# Patient Record
Sex: FEMALE | Race: WHITE | Employment: STUDENT | URBAN - METROPOLITAN AREA
[De-identification: names, ages, dates, MRNs, and addresses within clinical notes are randomized per-mention and may not be internally consistent; named-entity substitution may affect disease eponyms.]

---
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
## 2024-04-14 ENCOUNTER — HOSPITAL ENCOUNTER (INPATIENT)
Age: 19
LOS: 3 days | Discharge: HOME | DRG: 885 | End: 2024-04-17
Attending: EMERGENCY MEDICINE | Admitting: PSYCHIATRY & NEUROLOGY
Payer: COMMERCIAL

## 2024-04-14 ENCOUNTER — HOSPITAL ENCOUNTER (EMERGENCY)
Age: 19
Discharge: ANOTHER HOSPITAL | End: 2024-04-14
Attending: EMERGENCY MEDICINE
Payer: COMMERCIAL

## 2024-04-14 VITALS
TEMPERATURE: 97.8 F | OXYGEN SATURATION: 99 % | SYSTOLIC BLOOD PRESSURE: 118 MMHG | DIASTOLIC BLOOD PRESSURE: 64 MMHG | HEART RATE: 68 BPM | RESPIRATION RATE: 16 BRPM | BODY MASS INDEX: 22.43 KG/M2 | WEIGHT: 148 LBS | HEIGHT: 68 IN

## 2024-04-14 DIAGNOSIS — T50.904A DRUG OVERDOSE OF UNDETERMINED INTENT, INITIAL ENCOUNTER: Primary | ICD-10-CM

## 2024-04-14 DIAGNOSIS — T50.902A MEDICATION OVERDOSE, INTENTIONAL SELF-HARM, INITIAL ENCOUNTER (HCC): ICD-10-CM

## 2024-04-14 DIAGNOSIS — F10.920 ACUTE ALCOHOLIC INTOXICATION WITHOUT COMPLICATION (HCC): Primary | ICD-10-CM

## 2024-04-14 DIAGNOSIS — F32.89 OTHER DEPRESSION: ICD-10-CM

## 2024-04-14 DIAGNOSIS — F32.9 MAJOR DEPRESSIVE DISORDER WITH CURRENT ACTIVE EPISODE, UNSPECIFIED DEPRESSION EPISODE SEVERITY, UNSPECIFIED WHETHER RECURRENT: ICD-10-CM

## 2024-04-14 PROBLEM — F33.9 MAJOR DEPRESSIVE DISORDER, RECURRENT (HCC): Status: ACTIVE | Noted: 2024-04-14

## 2024-04-14 LAB
ALBUMIN SERPL-MCNC: 4.6 G/DL (ref 3.5–4.6)
ALP SERPL-CCNC: 74 U/L (ref 40–130)
ALT SERPL-CCNC: 15 U/L (ref 0–33)
AMPHET UR QL SCN: NORMAL
AMPHET UR QL SCN: NORMAL
ANION GAP SERPL CALCULATED.3IONS-SCNC: 12 MEQ/L (ref 9–15)
APAP SERPL-MCNC: <5 UG/ML (ref 10–30)
AST SERPL-CCNC: 27 U/L (ref 0–35)
BACTERIA URNS QL MICRO: ABNORMAL /HPF
BARBITURATES UR QL SCN: NORMAL
BARBITURATES UR QL SCN: NORMAL
BASOPHILS # BLD: 0 K/UL (ref 0–0.2)
BASOPHILS NFR BLD: 0.4 %
BENZODIAZ UR QL SCN: NORMAL
BENZODIAZ UR QL SCN: NORMAL
BILIRUB SERPL-MCNC: 0.4 MG/DL (ref 0.2–0.7)
BILIRUB UR QL STRIP: NEGATIVE
BUN SERPL-MCNC: 6 MG/DL (ref 6–20)
CALCIUM SERPL-MCNC: 9.3 MG/DL (ref 8.5–9.9)
CANNABINOIDS UR QL SCN: NORMAL
CANNABINOIDS UR QL SCN: NORMAL
CHLORIDE SERPL-SCNC: 103 MEQ/L (ref 95–107)
CLARITY UR: ABNORMAL
CO2 SERPL-SCNC: 26 MEQ/L (ref 20–31)
COCAINE UR QL SCN: NORMAL
COCAINE UR QL SCN: NORMAL
COLOR UR: YELLOW
CREAT SERPL-MCNC: 0.76 MG/DL (ref 0.5–0.9)
DRUG SCREEN COMMENT UR-IMP: NORMAL
DRUG SCREEN COMMENT UR-IMP: NORMAL
EKG ATRIAL RATE: 74 BPM
EKG P AXIS: 49 DEGREES
EKG P-R INTERVAL: 166 MS
EKG Q-T INTERVAL: 422 MS
EKG QRS DURATION: 84 MS
EKG QTC CALCULATION (BAZETT): 468 MS
EKG R AXIS: 82 DEGREES
EKG T AXIS: 56 DEGREES
EKG VENTRICULAR RATE: 74 BPM
EOSINOPHIL # BLD: 0.2 K/UL (ref 0–0.7)
EOSINOPHIL NFR BLD: 1.9 %
EPI CELLS #/AREA URNS AUTO: ABNORMAL /HPF (ref 0–5)
ERYTHROCYTE [DISTWIDTH] IN BLOOD BY AUTOMATED COUNT: 12 % (ref 11.5–14.5)
ETHANOL PERCENT: 0.12 G/DL
ETHANOLAMINE SERPL-MCNC: 143 MG/DL (ref 0–0.08)
FENTANYL SCREEN, URINE: NORMAL
FENTANYL SCREEN, URINE: NORMAL
GLOBULIN SER CALC-MCNC: 3.3 G/DL (ref 2.3–3.5)
GLUCOSE SERPL-MCNC: 86 MG/DL (ref 70–99)
GLUCOSE UR STRIP-MCNC: NEGATIVE MG/DL
HCG UR QL: NEGATIVE
HCT VFR BLD AUTO: 38.2 % (ref 37–47)
HGB BLD-MCNC: 12.6 G/DL (ref 12–16)
HGB UR QL STRIP: ABNORMAL
HYALINE CASTS #/AREA URNS AUTO: ABNORMAL /HPF (ref 0–5)
KETONES UR STRIP-MCNC: NEGATIVE MG/DL
LEUKOCYTE ESTERASE UR QL STRIP: ABNORMAL
LYMPHOCYTES # BLD: 2.3 K/UL (ref 1–4.8)
LYMPHOCYTES NFR BLD: 29 %
MAGNESIUM SERPL-MCNC: 2.1 MG/DL (ref 1.7–2.4)
MCH RBC QN AUTO: 29.1 PG (ref 27–31.3)
MCHC RBC AUTO-ENTMCNC: 33 % (ref 33–37)
MCV RBC AUTO: 88.2 FL (ref 79.4–94.8)
METHADONE UR QL SCN: NORMAL
METHADONE UR QL SCN: NORMAL
MONOCYTES # BLD: 0.5 K/UL (ref 0.2–0.8)
MONOCYTES NFR BLD: 5.9 %
NEUTROPHILS # BLD: 4.9 K/UL (ref 1.4–6.5)
NEUTS SEG NFR BLD: 62.5 %
NITRITE UR QL STRIP: NEGATIVE
OPIATES UR QL SCN: NORMAL
OPIATES UR QL SCN: NORMAL
OXYCODONE UR QL SCN: NORMAL
OXYCODONE UR QL SCN: NORMAL
PCP UR QL SCN: NORMAL
PCP UR QL SCN: NORMAL
PH UR STRIP: 6 [PH] (ref 5–9)
PLATELET # BLD AUTO: 298 K/UL (ref 130–400)
POTASSIUM SERPL-SCNC: 3.7 MEQ/L (ref 3.4–4.9)
PROPOXYPH UR QL SCN: NORMAL
PROPOXYPH UR QL SCN: NORMAL
PROT SERPL-MCNC: 7.9 G/DL (ref 6.3–8)
PROT UR STRIP-MCNC: NEGATIVE MG/DL
RBC # BLD AUTO: 4.33 M/UL (ref 4.2–5.4)
RBC #/AREA URNS AUTO: ABNORMAL /HPF (ref 0–5)
SALICYLATES SERPL-MCNC: <0.3 MG/DL (ref 15–30)
SODIUM SERPL-SCNC: 141 MEQ/L (ref 135–144)
SP GR UR STRIP: 1.01 (ref 1–1.03)
TSH SERPL-MCNC: 1.25 UIU/ML (ref 0.44–3.86)
URINE REFLEX TO CULTURE: YES
UROBILINOGEN UR STRIP-ACNC: 0.2 E.U./DL
WBC # BLD AUTO: 7.8 K/UL (ref 4.5–11)
WBC #/AREA URNS AUTO: ABNORMAL /HPF (ref 0–5)

## 2024-04-14 PROCEDURE — 81001 URINALYSIS AUTO W/SCOPE: CPT

## 2024-04-14 PROCEDURE — 99285 EMERGENCY DEPT VISIT HI MDM: CPT

## 2024-04-14 PROCEDURE — 80143 DRUG ASSAY ACETAMINOPHEN: CPT

## 2024-04-14 PROCEDURE — 6370000000 HC RX 637 (ALT 250 FOR IP): Performed by: EMERGENCY MEDICINE

## 2024-04-14 PROCEDURE — 80053 COMPREHEN METABOLIC PANEL: CPT

## 2024-04-14 PROCEDURE — 93005 ELECTROCARDIOGRAM TRACING: CPT

## 2024-04-14 PROCEDURE — 93010 ELECTROCARDIOGRAM REPORT: CPT | Performed by: INTERNAL MEDICINE

## 2024-04-14 PROCEDURE — 83036 HEMOGLOBIN GLYCOSYLATED A1C: CPT

## 2024-04-14 PROCEDURE — 87491 CHLMYD TRACH DNA AMP PROBE: CPT

## 2024-04-14 PROCEDURE — 83735 ASSAY OF MAGNESIUM: CPT

## 2024-04-14 PROCEDURE — 84703 CHORIONIC GONADOTROPIN ASSAY: CPT

## 2024-04-14 PROCEDURE — 80179 DRUG ASSAY SALICYLATE: CPT

## 2024-04-14 PROCEDURE — 84443 ASSAY THYROID STIM HORMONE: CPT

## 2024-04-14 PROCEDURE — 87591 N.GONORRHOEAE DNA AMP PROB: CPT

## 2024-04-14 PROCEDURE — 82077 ASSAY SPEC XCP UR&BREATH IA: CPT

## 2024-04-14 PROCEDURE — 1240000000 HC EMOTIONAL WELLNESS R&B

## 2024-04-14 PROCEDURE — 80307 DRUG TEST PRSMV CHEM ANLYZR: CPT

## 2024-04-14 PROCEDURE — 36415 COLL VENOUS BLD VENIPUNCTURE: CPT

## 2024-04-14 PROCEDURE — 2580000003 HC RX 258: Performed by: EMERGENCY MEDICINE

## 2024-04-14 PROCEDURE — 6370000000 HC RX 637 (ALT 250 FOR IP): Performed by: STUDENT IN AN ORGANIZED HEALTH CARE EDUCATION/TRAINING PROGRAM

## 2024-04-14 PROCEDURE — 85025 COMPLETE CBC W/AUTO DIFF WBC: CPT

## 2024-04-14 PROCEDURE — 96360 HYDRATION IV INFUSION INIT: CPT

## 2024-04-14 PROCEDURE — 87086 URINE CULTURE/COLONY COUNT: CPT

## 2024-04-14 RX ORDER — ACETAMINOPHEN 325 MG/1
650 TABLET ORAL EVERY 4 HOURS PRN
Status: DISCONTINUED | OUTPATIENT
Start: 2024-04-14 | End: 2024-04-17 | Stop reason: HOSPADM

## 2024-04-14 RX ORDER — ACETAMINOPHEN 500 MG
1000 TABLET ORAL ONCE
Status: COMPLETED | OUTPATIENT
Start: 2024-04-14 | End: 2024-04-14

## 2024-04-14 RX ORDER — BENZTROPINE MESYLATE 1 MG/ML
2 INJECTION INTRAMUSCULAR; INTRAVENOUS 2 TIMES DAILY PRN
Status: DISCONTINUED | OUTPATIENT
Start: 2024-04-14 | End: 2024-04-17 | Stop reason: HOSPADM

## 2024-04-14 RX ORDER — HALOPERIDOL 5 MG/1
5 TABLET ORAL EVERY 6 HOURS PRN
Status: DISCONTINUED | OUTPATIENT
Start: 2024-04-14 | End: 2024-04-17 | Stop reason: HOSPADM

## 2024-04-14 RX ORDER — HALOPERIDOL 5 MG/ML
5 INJECTION INTRAMUSCULAR EVERY 6 HOURS PRN
Status: DISCONTINUED | OUTPATIENT
Start: 2024-04-14 | End: 2024-04-17 | Stop reason: HOSPADM

## 2024-04-14 RX ORDER — NITROFURANTOIN 25; 75 MG/1; MG/1
100 CAPSULE ORAL EVERY 12 HOURS SCHEDULED
Status: DISCONTINUED | OUTPATIENT
Start: 2024-04-14 | End: 2024-04-17 | Stop reason: HOSPADM

## 2024-04-14 RX ORDER — HYDROXYZINE HYDROCHLORIDE 50 MG/ML
50 INJECTION, SOLUTION INTRAMUSCULAR EVERY 6 HOURS PRN
Status: DISCONTINUED | OUTPATIENT
Start: 2024-04-14 | End: 2024-04-17 | Stop reason: HOSPADM

## 2024-04-14 RX ORDER — LORAZEPAM 1 MG/1
1 TABLET ORAL ONCE
Status: COMPLETED | OUTPATIENT
Start: 2024-04-14 | End: 2024-04-14

## 2024-04-14 RX ORDER — PROCHLORPERAZINE MALEATE 10 MG
10 TABLET ORAL ONCE
Status: COMPLETED | OUTPATIENT
Start: 2024-04-14 | End: 2024-04-14

## 2024-04-14 RX ORDER — 0.9 % SODIUM CHLORIDE 0.9 %
1000 INTRAVENOUS SOLUTION INTRAVENOUS ONCE
Status: COMPLETED | OUTPATIENT
Start: 2024-04-14 | End: 2024-04-14

## 2024-04-14 RX ORDER — MAGNESIUM HYDROXIDE/ALUMINUM HYDROXICE/SIMETHICONE 120; 1200; 1200 MG/30ML; MG/30ML; MG/30ML
30 SUSPENSION ORAL PRN
Status: DISCONTINUED | OUTPATIENT
Start: 2024-04-14 | End: 2024-04-17 | Stop reason: HOSPADM

## 2024-04-14 RX ORDER — TRAZODONE HYDROCHLORIDE 50 MG/1
50 TABLET ORAL NIGHTLY PRN
Status: DISCONTINUED | OUTPATIENT
Start: 2024-04-14 | End: 2024-04-17 | Stop reason: HOSPADM

## 2024-04-14 RX ORDER — DIPHENHYDRAMINE HCL 25 MG
50 TABLET ORAL ONCE
Status: COMPLETED | OUTPATIENT
Start: 2024-04-14 | End: 2024-04-14

## 2024-04-14 RX ORDER — HYDROXYZINE PAMOATE 50 MG/1
50 CAPSULE ORAL EVERY 6 HOURS PRN
Status: DISCONTINUED | OUTPATIENT
Start: 2024-04-14 | End: 2024-04-17 | Stop reason: HOSPADM

## 2024-04-14 RX ADMIN — DIPHENHYDRAMINE HCL 50 MG: 25 TABLET ORAL at 10:35

## 2024-04-14 RX ADMIN — NITROFURANTOIN MONOHYDRATE/MACROCRYSTALS 100 MG: 75; 25 CAPSULE ORAL at 09:29

## 2024-04-14 RX ADMIN — LORAZEPAM 1 MG: 1 TABLET ORAL at 05:20

## 2024-04-14 RX ADMIN — NITROFURANTOIN MONOHYDRATE/MACROCRYSTALS 100 MG: 75; 25 CAPSULE ORAL at 21:21

## 2024-04-14 RX ADMIN — SODIUM CHLORIDE 1000 ML: 9 INJECTION, SOLUTION INTRAVENOUS at 01:13

## 2024-04-14 RX ADMIN — ACTIVATED CHARCOAL 50 G: 208 SUSPENSION ORAL at 01:13

## 2024-04-14 RX ADMIN — PROCHLORPERAZINE MALEATE 10 MG: 10 TABLET ORAL at 10:35

## 2024-04-14 RX ADMIN — ACETAMINOPHEN 1000 MG: 500 TABLET ORAL at 10:35

## 2024-04-14 ASSESSMENT — PAIN - FUNCTIONAL ASSESSMENT
PAIN_FUNCTIONAL_ASSESSMENT: NONE - DENIES PAIN
PAIN_FUNCTIONAL_ASSESSMENT: NONE - DENIES PAIN

## 2024-04-14 ASSESSMENT — SLEEP AND FATIGUE QUESTIONNAIRES
DO YOU USE A SLEEP AID: YES
AVERAGE NUMBER OF SLEEP HOURS: 6
SLEEP PATTERN: NORMAL
DO YOU HAVE DIFFICULTY SLEEPING: NO

## 2024-04-14 ASSESSMENT — LIFESTYLE VARIABLES
HOW OFTEN DO YOU HAVE A DRINK CONTAINING ALCOHOL: 2-4 TIMES A MONTH
HOW MANY STANDARD DRINKS CONTAINING ALCOHOL DO YOU HAVE ON A TYPICAL DAY: 1 OR 2
HOW MANY STANDARD DRINKS CONTAINING ALCOHOL DO YOU HAVE ON A TYPICAL DAY: 1 OR 2
HOW OFTEN DO YOU HAVE A DRINK CONTAINING ALCOHOL: 2-4 TIMES A MONTH

## 2024-04-14 ASSESSMENT — PAIN SCALES - GENERAL: PAINLEVEL_OUTOF10: 6
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
## 2024-04-14 NOTE — ED TRIAGE NOTES
Pt. Presents to ED with complaints of taking a handful of her lexapro and lamictal tonight. Pt. States she was having thought of past experiences/trauma and she took a handful of the medication to get those thoughts out of her mind.  She had no intention of harming herself she states.

## 2024-04-14 NOTE — ED NOTES
Called [facility] the room is ready for the pt, advised she is visiting with her parents here from [place]
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
## 2024-04-14 NOTE — ED NOTES
Call received from Emery (director of counseling at [facility]) He left number and also encouraged for her to possibly be released to her parents instead of being admitted. States her parents plan on driving up today from [place] and he feels  they should take over the care of her. Informed Emery I can not confirm if this patient is here or not but if she was I would pass along the information to her with his phone number. He also states to let her know if she is being admitted to contact him and he would notify her professors for excused absences.

## 2024-04-14 NOTE — ED NOTES
Patient voices c/o migraine. States she takes migraine medication as needed but unsure what she takes. Dr. Shaver aware of request for medication for migraine

## 2024-04-14 NOTE — ED NOTES
Pt was asked, advised of mothers request to remain at the hospital here until a bed was available for her or going/transfer to possibly [facility] or another facility.  Pt stated she would go to [facility] rather than waiting for a bed here at the hospital

## 2024-04-14 NOTE — ED NOTES
Discussed with the pt that she has a visitor here to see her, her  from [facility], per pt she will visit with the .  Pt is visiting with her  from [facility] at her bedside.

## 2024-04-14 NOTE — ED NOTES
Pt's mother Sariah called to the -ER, advised could not give her information without her having her daughter's HIPAA code, put the caller on hold and asked the pt if she wanted her mother to have her code, she said her mother could have her code. Pt's mother Sariah wanted her to possibly stay at the ER until a bed was available for her on [facility] the unit here instead of [facility] [place].  Advised that there would not possibly be a bed on the unit tomorrow would depend on D/C and geoff if a bed was available for her tomorrow it would not be available until late afternoon and would depend on D/C from the unit.  Advised would leave the decision to stay or transfer to the pt.  Pt's mother has the pt's HIPAA code from the pt.

## 2024-04-14 NOTE — GROUP NOTE
Group Therapy Note    Date: 4/14/2024    Group Start Time: 1730  Group End Time: 1820  Group Topic: Healthy Living/Wellness    [facility] Carrie Ortiz RN; , GOVIND Butler        Group Therapy Note    Attendees: 11/25       Patient's Goal:  recreation    Notes:      Status After Intervention:  Unchanged    Participation Level: Active Listener    Participation Quality: Appropriate      Speech:  normal      Thought Process/Content: Logical      Affective Functioning: Congruent      Mood: euthymic      Level of consciousness:  Alert and Oriented x4      Response to Learning: Progressing to goal      Endings: None Reported    Modes of Intervention: Socialization      Discipline Responsible: Registered Nurse      Signature:  Carrie Vega RN

## 2024-04-14 NOTE — ED NOTES
Poison control contacted.  Per poison control they recommend charcoal if medication ingested within an hour.  Also recommend suicide work up, EKG, and supportive care such as vital signs, mental status.  Dr. Dominique notified.

## 2024-04-14 NOTE — ED NOTES
Called lab regarding the urine test called earlier in regards to and per lab it is a send out and the test has been processed for the send out.

## 2024-04-14 NOTE — ED NOTES
Patient placed with [facility] at this time. Recommended for them to start with [place] as first option.

## 2024-04-14 NOTE — ED NOTES
Reviewed with Dr Mario the pt that was being sent out due to no beds on [facility] but their is a female D/C on [facility] today, per Dr Mario the pt can be admitted to the unit, pt was advised she would be admitted today to [facility] but the bed is not available currently until later.

## 2024-04-14 NOTE — ED NOTES
Dr. Mario was updated and reviewed situation, family concerns and calls from the Psychiatrist in [place]. Dr. Mario is agreeable that patient needs inpatient psychiatric care regardless if family believes this was attention seeking behavior or alcohol induced.

## 2024-04-14 NOTE — ED TRIAGE NOTES
Pt arrived to ED via EMS with c/o of ingestion. Pt is a transfer from [facility]. Per EMS at approx 2330 pt took a handful of lexpro. Pt states she has been drinking tonight. Pt denies any thoughts of SI/HI. Pt is a&ox4. Pt is ambulatory from EMS cot to ER stretcher with steady gait

## 2024-04-14 NOTE — ED NOTES
Dr Anabela Shetty called and wanted to talk with a doctor taking care of the pt, advised would try and get the medical doctor to talk with her, no information was given to the psychiatrist who called, transferred the call to Dr Shaver, alerted the doctor to the call before transferring the caller.  Never advised the psychiatrist of any information on the pt.  Transferred to the doctor in main ER.

## 2024-04-14 NOTE — ED NOTES
Called lab and they have enough urine in the lab that the lab will run the test for trachomatis/Gonorrheae
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
## 2024-04-14 NOTE — ED NOTES
Dr Anabela Shetty, Psychiatrist called wanting to talk with the pt, no information given to the doctor, she is on hold.  Asked pt if she wanted to talk with her Psychiatrist, explained HIPAA to the pt  and gave her  code  explained once she give he cod   someone the the can receive care on her while she is at the ER

## 2024-04-14 NOTE — ED PROVIDER NOTES
[facility] ED  eMERGENCY dEPARTMENT eNCOUnter      Pt Name: Juvencio Yun  MRN: [id number]  Birthdate 2005  Date of evaluation: 4/14/2024  Provider: Selvin Dominique MD    CHIEF COMPLAINT       Chief Complaint   Patient presents with    Mental Health Problem     Pt. Took handful of lexapro and lamictal approximately 15 minute prior to arrival         HISTORY OF PRESENT ILLNESS   (Location/Symptom, Timing/Onset,Context/Setting, Quality, Duration, Modifying Factors, Severity)  Note limiting factors.   Juvencio Yun is a 19 y.o. female who presents to the emergency department with complaint of tachycardia and may have follow-up Lexapro and Lamictal approximately 50 minutes prior to presentation.  Patient claims that she was depressed, and was having flashbacks from prior traumatic events in her life and took the pills to suppress those.  Denies suicidal or homicidal ideation or thoughts.  Denies prior attempts of suicide with drug overdose.  She is on Lexapro and Lamictal for depression.  She also admits to be drinking alcohol prior to this episode.  Comorbid conditions includes moderate persistent asthma and chronic nonseasonal allergic rhinitis.  She appears tearful and emotional.    HPI    Nursing Notes were reviewed.    REVIEW OF SYSTEMS    (2-9 systems for level 4, 10 or more for level 5)     Review of Systems   Constitutional: Negative.  Negative for activity change, appetite change, chills, fatigue and fever.   HENT:  Negative for congestion, ear discharge, ear pain, hearing loss, rhinorrhea, sinus pressure and sore throat.    Eyes:  Negative for photophobia, pain and visual disturbance.   Respiratory:  Negative for apnea, cough, shortness of breath and wheezing.    Cardiovascular:  Negative for chest pain, palpitations and leg swelling.   Gastrointestinal:  Negative for abdominal distention, abdominal pain, constipation, diarrhea, nausea and vomiting.   Endocrine: Negative for cold intolerance,

## 2024-04-14 NOTE — ED NOTES
Explained and answered her questions regarding the med's given to her, she wanted to know how long the Antibiotic would be needed, advised she would need to take it possibly twice a day for a week

## 2024-04-14 NOTE — ED NOTES
[facility] will have discharge today. Will consult with Dr. Mario to see if patient can go to [facility] instead of being transferred out.

## 2024-04-15 LAB
BACTERIA UR CULT: NORMAL
ESTIMATED AVERAGE GLUCOSE: 91 MG/DL
HBA1C MFR BLD: 4.8 % (ref 4–6)

## 2024-04-15 PROCEDURE — 6370000000 HC RX 637 (ALT 250 FOR IP)

## 2024-04-15 PROCEDURE — 6370000000 HC RX 637 (ALT 250 FOR IP): Performed by: PSYCHIATRY & NEUROLOGY

## 2024-04-15 PROCEDURE — 6370000000 HC RX 637 (ALT 250 FOR IP): Performed by: EMERGENCY MEDICINE

## 2024-04-15 PROCEDURE — 1240000000 HC EMOTIONAL WELLNESS R&B

## 2024-04-15 RX ORDER — ALBUTEROL SULFATE 90 UG/1
2 AEROSOL, METERED RESPIRATORY (INHALATION) EVERY 4 HOURS PRN
Status: DISCONTINUED | OUTPATIENT
Start: 2024-04-15 | End: 2024-04-17 | Stop reason: HOSPADM

## 2024-04-15 RX ADMIN — ACETAMINOPHEN, ASPIRIN, CAFFEINE 1 TABLET: 250; 65; 250 TABLET, FILM COATED ORAL at 21:16

## 2024-04-15 RX ADMIN — ACETAMINOPHEN 650 MG: 325 TABLET ORAL at 02:27

## 2024-04-15 RX ADMIN — NITROFURANTOIN MONOHYDRATE/MACROCRYSTALS 100 MG: 75; 25 CAPSULE ORAL at 08:34

## 2024-04-15 RX ADMIN — NITROFURANTOIN MONOHYDRATE/MACROCRYSTALS 100 MG: 75; 25 CAPSULE ORAL at 21:14

## 2024-04-15 ASSESSMENT — ENCOUNTER SYMPTOMS
VOMITING: 0
WHEEZING: 0
BACK PAIN: 0
ABDOMINAL PAIN: 0
RHINORRHEA: 0
INGESTION: 1
SHORTNESS OF BREATH: 0
NAUSEA: 0
SORE THROAT: 0

## 2024-04-15 ASSESSMENT — PATIENT HEALTH QUESTIONNAIRE - PHQ9
2. FEELING DOWN, DEPRESSED OR HOPELESS: SEVERAL DAYS
SUM OF ALL RESPONSES TO PHQ QUESTIONS 1-9: 1
SUM OF ALL RESPONSES TO PHQ9 QUESTIONS 1 & 2: 1
1. LITTLE INTEREST OR PLEASURE IN DOING THINGS: NOT AT ALL
SUM OF ALL RESPONSES TO PHQ QUESTIONS 1-9: 1

## 2024-04-15 ASSESSMENT — LIFESTYLE VARIABLES
HOW OFTEN DO YOU HAVE A DRINK CONTAINING ALCOHOL: 2-4 TIMES A MONTH
HOW MANY STANDARD DRINKS CONTAINING ALCOHOL DO YOU HAVE ON A TYPICAL DAY: 3 OR 4

## 2024-04-15 ASSESSMENT — PAIN DESCRIPTION - DESCRIPTORS: DESCRIPTORS: ACHING

## 2024-04-15 ASSESSMENT — PAIN DESCRIPTION - LOCATION
LOCATION: HEAD
LOCATION: HEAD

## 2024-04-15 ASSESSMENT — PAIN SCALES - GENERAL
PAINLEVEL_OUTOF10: 4
PAINLEVEL_OUTOF10: 6
PAINLEVEL_OUTOF10: 5
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
## 2024-04-15 NOTE — GROUP NOTE
Group Therapy Note    Date: 4/15/2024    Group Start Time: 1210  Group End Time: 1300  Group Topic: Activity    [facility] Lui You        Group Therapy Note    Attendees: 10     Notes:  Pt did not attend today's positive affirmation group session.      Modes of Intervention: Activity      Discipline Responsible: Behavorial Health Tech      Signature:  Lui Hernandez

## 2024-04-15 NOTE — CARE COORDINATION
Dr. KIRK approved mother and father to both visit due to flying in from [place] to be with their daughter.

## 2024-04-15 NOTE — GROUP NOTE
Date: 4/15/2024    Group Start Time: 0940  Group End Time: 1050  Group Topic: Music Therapy    [facility] Shea Burks    Album of Me  Patients will listen to \"100 Years\" by [employer] and discuss lyrics and song interpretations as a group. Patients will discuss how their use of music may/may not have changed throughout their lives. Patients will create an \"Album of Me\" where they identify songs from childhood, teenage years, adulthood, where they are currently, and their favorite song right now. Patients will be invited to select a song and be encouraged to explain their connection to it. Patients create a group playlist and contribute one song each. Patients will listen to the playlist and reflect on their experiences afterwards.     Focus: Coping Skills, Validation/Support, Self-Esteem, Self-Expression, & Insight Development    Goals: Improve Mood, Improve Insight/Self-Awareness, Increase Socialization/Community Building, Increase Self-Expression, Improve Attention to Task, Improve Coping Skills/Develop Coping Skills     Patient listened to peer discussions about the song and topics that arose from the lyrics. Patient expressed validation in hearing peers' shared experiences. Patient independently completed her playlist and selected a song to contribute. Patient verbalized support for peers' song choices and shared her personal connections to them. Patient was unable to listen to her chosen song d/t being pulled by staff.     Attended: 1/2-3/4 attendance and With staff  Patient's Goal: \"to be peaceful\"     Participation Level: Active Listener and Interactive  Participation Quality: Attentive, Sharing, and Supportive  Affect/Mood: Bright/Brightens  Speech: spontaneous, normal rate, and normal volume   Thought Content/Processes: Linear  Level of consciousness: Alert  Response: Able to verbalize current knowledge/experience and Able to verbalize/acknowledge new learning  Successfully internalized the

## 2024-04-15 NOTE — GROUP NOTE
Date: 4/15/2024    Group Start Time: 1330  Group End Time: 1430  Group Topic: Music Therapy    [facility] 3W Shea Burks    Active Music Making and Live Music Listening  Patients will be given a songbook and offered the opportunity to select (a) song(s) of their choice for live music listening on piano. Patients will be encouraged to sing along, move along, and listen to the music.     Focus: Self-Expression, Creativity, Processing, and Self-Esteem    Goals: Improve Mood, Decrease Isolation, Increase Sense of Community/Socialization, Improve Self-Esteem, Increase Self-Expression, Provide Sense of Autonomy, Develop Coping Skills    Patient selected several songs for live music listening. Patient moved to, sang along to, and commented on familiar music. Patient was initially hesitant to sing, but sang with encouragement from MT/peers. Patient was smiling and laughing throughout group.     Attended: 1/2-3/4 attendance  Participation Level: Active Listener and Interactive  Participation Quality: Attentive, Sharing, and Supportive  Affect/Mood: Congruent/Euthymic and Brightens  Speech: spontaneous, normal rate, and normal volume   Thought Content/Processes: Linear  Level of consciousness: Alert  Response: Able to verbalize current knowledge/experience  Successfully internalized the purpose of intervention and Actively participated in the group experience  Modes of Intervention: Active Music Making and Live Music Listening    Discipline Responsible: Music Therapist  Signature: KRISTINA Petty, PsychEd Spec

## 2024-04-15 NOTE — PLAN OF CARE
support, including active listening and acknowledgement of concerns of patient and caregivers   Reduce environmental stimuli, as able  4/14/2024 2050 by Melva Escalera, RN  Outcome: Progressing     Problem: Decision Making  Goal: Pt/Family able to effectively weigh alternatives and participate in decision making related to treatment and care  Description: INTERVENTIONS:  1. Determine when there are differences between patient's view, family's view, and healthcare provider's view of condition  2. Facilitate patient and family articulation of goals for care  3. Help patient and family identify pros/cons of alternative solutions  4. Provide information as requested by patient/family  5. Respect patient/family right to receive or not to receive information  6. Serve as a liaison between patient and family and health care team  7. Initiate Consults from Ethics, Palliative Care or initiate Family Care Conference as is appropriate  4/15/2024 1045 by Macie Sanders RN  Outcome: Progressing  Flowsheets (Taken 4/15/2024 1043)  Patient/family able to effectively weigh alternatives and participate in decision making related to treatment and care:   Determine when there are differences between patient's view, family's view, and healthcare provider's view of condition   Facilitate patient and family articulation of goals for care   Help patient and family identify pros/cons of alternative solutions   Provide information as requested by patient/family  4/14/2024 2050 by Melva Escalera RN  Outcome: Progressing     Problem: Behavior  Goal: Pt/Family maintain appropriate behavior and adhere to behavioral management agreement, if implemented  Description: INTERVENTIONS:  1. Assess patient/family's coping skills and  non-compliant behavior (including use of illegal substances)  2. Notify security of behavior or suspected illegal substances which indicate the need for search of the family and/or belongings  3. Encourage verbalization  doctor's orders and will demonstrate appropriate behavior:   Treat underlying conditions and offer medication as ordered   Contain excessive/inappropriate behavior per unit and hospital policies   Educate regarding involuntary admission procedures and rules  4/14/2024 2050 by Melva Escalera, RN  Outcome: Progressing

## 2024-04-15 NOTE — CARE COORDINATION
Brief Intervention and Referral to Treatment Summary    Patient was provided PHQ-9, AUDIT-C and DAST Screening:      PHQ-9 Score: 1  AUDIT-C Score:  4  DAST Score:  0    Patient’s substance use is considered     Low Risk/Healthy   Moderate Risk X  Harmful   Dependent    Patient’s depression is considered:     Minimal  Mild   Moderate X  Moderately Severe  Severe    Brief Education Was Provided    Patient was receptive  Patient was not receptive X      Brief Intervention Is Provided (Only for AUDIT-C or DAST)     Patient reports readiness to decrease and/or stop use and a plan was discussed   Patient denies readiness to decrease and/or stop use and a plan was not discussed X      Recommendations/Referrals for Brief and/or Specialized Treatment Provided to Patient  Patient declined AA services and will follow up with provider at [facility], Dr. Shetty, and Dr. Emery Palacios [phone].

## 2024-04-15 NOTE — PLAN OF CARE
Patient is out on the phone. Not social. Remains discharge focused. Minimizes need to be here. Brightened affect. Denies depression. States anxiety is \"better\". Denies SI/HI and AVH. Denies further needs at this time  Problem: Risk for Elopement  Goal: Patient will not exit the unit/facility without proper excort  4/14/2024 2050 by Melva Escalera RN  Outcome: Progressing  4/14/2024 1657 by Mona Randhawa RN  Outcome: Progressing  Flowsheets  Taken 4/14/2024 1601 by Mona Randhawa RN  Nursing Interventions for Elopement Risk:   Assist with personal care needs such as toileting, eating, dressing, as needed to reduce the risk of wandering   Reduce environmental triggers  Taken 4/14/2024 0742 by Altagracia Trivedi RN  Nursing Interventions for Elopement Risk: Shoes and clothing collected and placed in gown attire     Problem: Anxiety  Goal: Will report anxiety at manageable levels  Description: INTERVENTIONS:  1. Administer medication as ordered  2. Teach and rehearse alternative coping skills  3. Provide emotional support with 1:1 interaction with staff  4/14/2024 2050 by Melva Escalera RN  Outcome: Progressing  4/14/2024 1657 by Mona Randhawa RN  Outcome: Progressing     Problem: Coping  Goal: Pt/Family able to verbalize concerns and demonstrate effective coping strategies  Description: INTERVENTIONS:  1. Assist patient/family to identify coping skills, available support systems and cultural and spiritual values  2. Provide emotional support, including active listening and acknowledgement of concerns of patient and caregivers  3. Reduce environmental stimuli, as able  4. Instruct patient/family in relaxation techniques, as appropriate  5. Assess for spiritual pain/suffering and initiate Spiritual Care, Psychosocial Clinical Specialist consults as needed  4/14/2024 2050 by Melva Escalera RN  Outcome: Progressing  4/14/2024 1657 by Mona Randhawa RN  Outcome: Progressing     Problem: Decision

## 2024-04-15 NOTE — H&P
4/14/2024)    Compliance:yes    Psychiatric Review of Systems       Depression: 5/10     Gillian or Hypomania:  yes - mood swings, less racing thoughts, after she started taking medication     Panic Attacks:  no     Phobias:  no     Obsessions and Compulsions:  no     PTSD : yes     Hallucinations:  no     Delusions:  no    Substance Abuse History:  ETOH: as above   Marijuana: no  Opiates: no  Other Drugs: no      Past Psychiatric History:  Prior Diagnosis:  MDD  Psychiatrist: [place]  Therapist:yes  Hospitalization: no  Hx of Suicidal Attempts: no  Hx of violence:  no  ECT: no  Previous discontinued Psychiatric Med Trials:     Past Medical History:        Diagnosis Date    Depression        Past Surgical History:    History reviewed. No pertinent surgical history.    Allergies:   Patient has no allergy information on record.    Family History  History reviewed. No pertinent family history.      Social History:  Born and Raised: [place]  Describes Childhood:   supportive  Education: College student  Relationships: single  Children: no children, 7 sibblings  Current Support: parents    Legal Hx: none  Access to weapons?:  No      EXAMINATION:    REVIEW OF SYSTEMS:    ROS:  [x] All negative/unchanged except if checked. Explain positive(checked items) below:  [] Constitutional  [] Eyes  [] Ear/Nose/Mouth/Throat  [] Respiratory  [] CV  [] GI  []   [] Musculoskeletal  [] Skin/Breast  [] Neurological  [] Endocrine  [] Heme/Lymph  [] Allergic/Immunologic    Explanation:     Vitals:  /77   Pulse 61   Temp 97.7 °F (36.5 °C) (Oral)   Resp 18   SpO2 100%      Neurologic Exam:   Muscle Strength & Tone: full ROM  cranial nerves II-XII grossly in tact  Gait: normal gait   Involuntary Movements: No    Mental Status Examination:    Level of consciousness:  within normal limits   Appearance:  ill-appearing  Behavior/Motor:  psychomotor retardation  Attitude toward examiner:  cooperative  Speech:  slow   Mood: anxious, constricted,

## 2024-04-15 NOTE — CARE COORDINATION
Leisure Assessment  April 15, 2024 /  1435 pm    Appearance: Alert, Appears as stated age, Pleasant, Sociable, and Well-groomed  Current Mental Status: Calm, Cooperative, and Insight into issues  Affect/Mood: Congruent/ Euthymic  Thought Content/Processes: Linear  Insight/Judgement: Good insight and Good judgment  Speech: spontaneous, normal rate, and normal volume  Delusions/Hallucinations: no evidence of delusions /  none observed/reported    Admit Status:  Pink Slipped    Patient agreeable to interview upon approach. Patient identified her leisure interests as reading, writing, and participating in [employer]. Patient reported that she prefers to spend her free time with others. Patient shared that she has a support system, but struggles to reach out for help when she needs it. Patient expressed that she typically expresses her emotions openly, but that recently she has been internalizing them. Patient described that prior to hospitalization, she was \"not doing well mentally\" and had increased stress d/t participation in several extracurriculars. Patient expressed that she was afraid to reach out for help even though it's there. Patient would like to develop more coping skills, reach out for help more, and \"learn how to be okay with being alone.\" Patient identified her strength as being caring.     Recommendations: Decrease Impulsivity/Impulsive Behaviors, Increase Socialization, Improve/Maintain Coping Skills Development, Improve/Maintain Emotion Regulation Skills/Mood, Improve/Maintain Expressive Communication/Self-Expression, and Improve/Maintain Insight/Self-Awareness    Documentation completed by KRISTINA Petty, PsychoEd Spec

## 2024-04-15 NOTE — H&P
DEPARTMENT OF HOSPITAL MEDICINE    MEDICAL HISTORY AND PHYSICAL EXAM    PATIENT NAME:  Juvencio Yun    MRN:  [id number]  SERVICE DATE:  4/15/2024   SERVICE TIME:  10:53 AM    Primary Care Physician: No primary care provider on file.     SUBJECTIVE  CHIEF COMPLAINT:  Ingestion (@2330)       Ingestion  Associated symptoms include headaches. Pertinent negatives include no abdominal pain, chest pain, congestion, fatigue, fever, myalgias, nausea, sore throat, vomiting or weakness.         Juvencio Yun is a 19 y.o. female with PMH of anxiety, asthma, and migraine, who presented to the hospital with complaints of taking \" a lot of pills\".  Per patient, she did not have suicidal ideation, but it was \"cold for attention.\"  Patient was admitted to the behavioral unit for evaluation and management.  IM hospitalist team was consulted for physical exam to rule out any co-morbid physical condition, and medically manage acute and chronic health conditions mentioned prior.  The patient is from [place], student at the [employer], lives at school dorm.   At the time of assessment, patient reports having headaches 2-3 times a week.  She also reports having intermittent asthma symptoms and used by exercising, which she manages with her inhaler prior to the activities. Patient denies current suicidal and homicidal ideation as well as any presence of visual, auditory, and tactile hallucinations.  Patient denies headaches, dizziness, shortness of breath, chest pain, N/V/D and change in appetite.    The primary encounter diagnosis was Acute alcoholic intoxication without complication (HCC). Diagnoses of Major depressive disorder with current active episode, unspecified depression episode severity, unspecified whether recurrent and Medication overdose, intentional self-harm, initial encounter (HCC) were also pertinent to this visit.      PAST MEDICAL HISTORY:    Past Medical History:   Diagnosis Date    Depression       PAST  warmth, or swelling of the joints  NEUROLOGIC:  Mental Status Exam:  Level of Alertness:   awake  Orientation:   person, place, time  Attention/Concentration:  normal  PSYCHIATRIC/BEHAVIORAL:  Behavior: Cooperative  Affect: Labile  Mood: Anxious  SKIN:  normal skin color, texture, turgor and no redness, warmth, or swelling    DATA:     Diagnostic tests reviewed for today's visit:    Most recent labs and imaging results reviewed.     LABS:    Abnormal Labs Reviewed   URINALYSIS WITH REFLEX TO CULTURE - Abnormal; Notable for the following components:       Result Value    Clarity, UA CLOUDY (*)     Blood, Urine TRACE (*)     Leukocyte Esterase, Urine MODERATE (*)     All other components within normal limits   MICROSCOPIC URINALYSIS - Abnormal; Notable for the following components:    Bacteria, UA FEW (*)     WBC, UA 20-50 (*)     RBC, UA 3-5 (*)     All other components within normal limits       IMAGING:   No results found.   No orders to display       ASSESSMENT AND PLAN    Major depressive disorder, recurrent  Anxiety and depression  emotional support has been provided  encourage participation in rehabilitation support group and recreational therapy  Dr. Alcocer manages    Moderate persistent asthma  Exercise-induced  Patient uses her inhaler 3-4 times a week  Started on breathing treatment as needed  VS per unit routine  UTI  Patient denies any symptoms at this time  Continue Macrobid 100 mg every 12 hours  Monitor kidney function  Encourage proper fluid intake    Migraine headache without aura  Patient reports having intermittent headache 2-3 times a week.  Started on Excedrin as needed  VS per unit routine    VTE Prophylaxis: Low risk for DVT    Plan of care discussed and agreed upon with: patient and care team    Additional work up or/and treatment plan may be added today or then after based on clinical progression. I am managing a portion of pt care. Some medical issues are handled by other specialists.
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
## 2024-04-16 PROCEDURE — 1240000000 HC EMOTIONAL WELLNESS R&B

## 2024-04-16 PROCEDURE — 6370000000 HC RX 637 (ALT 250 FOR IP): Performed by: EMERGENCY MEDICINE

## 2024-04-16 RX ADMIN — NITROFURANTOIN MONOHYDRATE/MACROCRYSTALS 100 MG: 75; 25 CAPSULE ORAL at 20:28

## 2024-04-16 RX ADMIN — NITROFURANTOIN MONOHYDRATE/MACROCRYSTALS 100 MG: 75; 25 CAPSULE ORAL at 10:41

## 2024-04-16 NOTE — GROUP NOTE
Group Therapy Note    Date: 4/16/2024    Group Start Time: 1600  Group End Time: 1630  Group Topic: Wrap-Up    [facility] 3W Amanda Summers        Group Therapy Note    Attendees: 13/23       Patient's Goal:  Pt goal is to feel good        Status After Intervention:  Unchanged    Participation Level: Interactive    Participation Quality: Attentive      Speech:  normal      Thought Process/Content: Logical      Affective Functioning: Congruent      Mood: euthymic      Level of consciousness:  Attentive      Response to Learning: Able to verbalize current knowledge/experience      Endings: None Reported    Modes of Intervention: Support      Discipline Responsible: PCA      Signature:  Amanda Rob

## 2024-04-16 NOTE — GROUP NOTE
Date: 4/16/2024    Group Start Time: 0935  Group End Time: 1020  Group Topic: Psychoeducation    [facility] 3W Shea Burks    Building Happiness and Domains of Wellness    Patients will discuss the 7 domains of wellness: physical, occupational, social, intellectual, environmental, emotional/psychological, and spiritual. Patients will identify areas that they excel in, and areas that they would like to work on in the future. Patients will reflect on the domains and how they impact overall health and well-being.    Intended Outcomes: Able to reflect/comment on own behavior, Able to manage/cope with feelings, Able to experience relief/decrease in symptoms, Able to self-disclose, Discussed coping, Displayed empathy, Identified feelings, Identified triggers, Identified distorted thoughts/beliefs, and Increased hopefulness    Patient engaged in discussions regarding the domains of wellness and their impact on our daily lives. Patient shared that she excels in the intellectual and physical domains of wellness through exercising regularly, reading, and expressing herself creatively. Patient nodded in agreement in response to peer statements and maintained a steady gaze towards peers that were sharing.    Attended: 3/4-full attendance  Participation Level: Active Listener and Interactive  Participation Quality: Attentive, Sharing, and Supportive  Affect/Mood: Congruent/Euthymic  Speech: normal rate and normal volume   Thought Content/Processes: Linear  Level of consciousness: Alert  Response: Able to verbalize current knowledge/experience and Able to verbalize/acknowledge new learning  Successfully internalized the purpose of intervention and Actively participated in the group experience  Education, Support, Exploration, Clarifying, Problem-solving, and Confrontation  Discipline Responsible: Music Therapist  Signature: Shea Lara, MT-BC, PsychEd Spec

## 2024-04-16 NOTE — GROUP NOTE
Group Therapy Note    Date: 4/15/2024    Group Start Time: 1910  Group End Time: 1945  Group Topic: Wrap-Up    [facility] 3W Amanda Summers        Group Therapy Note    Attendees: 10/23       Patient's Goal:  Pt goal is to be peaceful        Status After Intervention:  Unchanged    Participation Level: Interactive    Participation Quality: Attentive      Speech:  normal      Thought Process/Content: Logical      Affective Functioning: Congruent      Mood: euthymic      Level of consciousness:  Attentive      Response to Learning: Able to verbalize current knowledge/experience      Endings: None Reported    Modes of Intervention: Support      Discipline Responsible: PCA      Signature:  Amanda Rob

## 2024-04-16 NOTE — PLAN OF CARE
Patient visible on unit, stays to self but is friendly with staff. Pt affect bright and reactive. Pt denies any level of anxiety or depression. Patient denies SI, HI or AVH. Pt reports feeling \"a lot better with my family here, I should have reached out to them sooner..\" Patient denies any other stressors in her life, she states she is doing well academically. Pt noted to be on phone much of evening. Pt received a call from her private psychiatrist and talked with her on phone for long while. Pt denies any further needs at current time.   Problem: Risk for Elopement  Goal: Patient will not exit the unit/facility without proper excort  4/15/2024 2350 by Margarita Dave RN  Outcome: Progressing  4/15/2024 1045 by Macie Sanders RN  Outcome: Progressing     Problem: Anxiety  Goal: Will report anxiety at manageable levels  4/15/2024 2350 by Margarita Dave RN  Outcome: Progressing  4/15/2024 1045 by Macie Sanders RN  Outcome: Progressing     Problem: Coping  Goal: Pt/Family able to verbalize concerns and demonstrate effective coping strategies  4/15/2024 2350 by Margarita Dave RN  Outcome: Progressing  4/15/2024 1045 by Macie Sanders RN  Outcome: Progressing     Problem: Decision Making  Goal: Pt/Family able to effectively weigh alternatives and participate in decision making related to treatment and care  4/15/2024 2350 by Margarita Dave RN  Outcome: Progressing  4/15/2024 1045 by Macie Sanders RN  Outcome: Progressing     Problem: Behavior  Goal: Pt/Family maintain appropriate behavior and adhere to behavioral management agreement, if implemented  4/15/2024 2350 by Margarita Dave RN  Outcome: Progressing  4/15/2024 1045 by Macie Sanders RN  Outcome: Progressing     Problem: Depression/Self Harm  Goal: Effect of psychiatric condition will be minimized and patient will be protected from self harm  4/15/2024 2350 by Margarita Dave RN  Outcome: Progressing  4/15/2024 1045 by Macie Sanders RN  Outcome: Progressing     Problem:

## 2024-04-16 NOTE — PLAN OF CARE
Pt's mother called for an update and provided HIPAA code. This nurse answered her questions regarding her potential discharge date and what the process will be like. Pt alerted that her mom would like her to call her.       Problem: Risk for Elopement  Goal: Patient will not exit the unit/facility without proper excort  4/16/2024 1122 by Carrie Anaya RN  Outcome: Progressing  4/15/2024 2350 by Margarita Dave RN  Outcome: Progressing     Problem: Anxiety  Goal: Will report anxiety at manageable levels  Description: INTERVENTIONS:  1. Administer medication as ordered  2. Teach and rehearse alternative coping skills  3. Provide emotional support with 1:1 interaction with staff  4/16/2024 1122 by Carrie Anaya RN  Outcome: Progressing  4/15/2024 2350 by Margarita Dave RN  Outcome: Progressing     Problem: Coping  Goal: Pt/Family able to verbalize concerns and demonstrate effective coping strategies  Description: INTERVENTIONS:  1. Assist patient/family to identify coping skills, available support systems and cultural and spiritual values  2. Provide emotional support, including active listening and acknowledgement of concerns of patient and caregivers  3. Reduce environmental stimuli, as able  4. Instruct patient/family in relaxation techniques, as appropriate  5. Assess for spiritual pain/suffering and initiate Spiritual Care, Psychosocial Clinical Specialist consults as needed  4/16/2024 1122 by Carrie Anaya RN  Outcome: Progressing  4/15/2024 2350 by Margarita Dave RN  Outcome: Progressing     Problem: Decision Making  Goal: Pt/Family able to effectively weigh alternatives and participate in decision making related to treatment and care  Description: INTERVENTIONS:  1. Determine when there are differences between patient's view, family's view, and healthcare provider's view of condition  2. Facilitate patient and family articulation of goals for care  3. Help patient and family identify pros/cons of alternative

## 2024-04-16 NOTE — GROUP NOTE
Group Therapy Note    Date: 4/16/2024    Group Start Time: 1215  Group End Time: 1255  Group Topic: Activity    [facility] Lui You        Group Therapy Note    Attendees: 8       Patient's Goal:  \"To feel good.\"    Notes:  Pt did not attend today's group activity.        Modes of Intervention: Activity      Discipline Responsible: Behavorial Health Tech      Signature:  Lui Hernandez

## 2024-04-16 NOTE — GROUP NOTE
Date: 4/16/2024    Group Start Time: 1340  Group End Time: 1425  Group Topic: Music Therapy    [facility] [place] [facility] Shea Burks Meghana Poetry and Song Sharing  Patients will be given a sheet of lyrics compiled from different songs related to mental health, substance abuse recovery, and healing. Patients will color, highlight, underline, etc. lyrics/parts of lyrics that stand out to them. Patients will cross out/\"black out\" any remaining lyrics and encouraged to utilize colored pencils/markers to make it their own. Patients will share their new poems with the group and discuss how meghana interpretations changed. If there is time, patients will be invited to hear a song they enjoy/connect with.     Focus: Self-Esteem, Creativity, Self-Expression, Having Positive Experiences    Goals: Improve Mood, Improve Insight/Self-Awareness, Increase Socialization/Community Building, Increase Self-Expression, Improve Attention to Task, Improve Coping Skills/Develop Coping Skills    Patient independently completed her blackout meghana poetry and read it aloud to the group. Patient socialized with peers over shared music interests and was observed smiling and laughing with peers. Patient verbalized support for others' contributions and thanked MT for group.    Attended: 3/4-full attendance  Participation Level: Active Listener and Interactive  Participation Quality: Attentive, Sharing, and Supportive  Affect/Mood: Bright/Euthymic  Speech: normal rate and normal volume   Thought Content/Processes: Linear  Level of consciousness: Alert  Response: Able to verbalize current knowledge/experience and Able to verbalize/acknowledge new learning  Successfully internalized the purpose of intervention and Actively participated in the group experience  Modes of Intervention: Composition, Meghana Analysis and Song Discussion, and Receptive Music Listening    Discipline Responsible: Music Therapist  Signature: Shea Lara, MT-BC, PsychEd

## 2024-04-16 NOTE — GROUP NOTE
Group Therapy Note    Date: 4/15/2024    Group Start Time: 1915  Group End Time: 1945  Group Topic: Recreational    [facility] Amanda Summers        Group Therapy Note    Attendees: 12/23       Patient's Goal:  Pt was able to socialize while playing pictionary        Status After Intervention:  Unchanged    Participation Level: Interactive    Participation Quality: Attentive      Speech:  normal      Thought Process/Content: Logical      Affective Functioning: Congruent      Mood: euthymic      Level of consciousness:  Attentive      Response to Learning: Able to verbalize current knowledge/experience      Endings: None Reported    Modes of Intervention: Activity      Discipline Responsible: PCA      Signature:  Amanda Rob

## 2024-04-16 NOTE — GROUP NOTE
Group Therapy Note    Date: 4/16/2024    Group Start Time: 1915  Group End Time: 1950  Group Topic: Recreational    [facility] Amanda Summers        Group Therapy Note    Attendees: 13/23       Patient's Goal:  Pt was able to express feelings with the how are you coping cards        Status After Intervention:  Unchanged    Participation Level: Interactive    Participation Quality: Attentive      Speech:  normal      Thought Process/Content: Logical      Affective Functioning: Congruent      Mood: euthymic      Level of consciousness:  Attentive      Response to Learning: Able to verbalize current knowledge/experience      Endings: None Reported    Modes of Intervention: Activity      Discipline Responsible: PCA      Signature:  Amanda Rob

## 2024-04-16 NOTE — PROGRESS NOTES
Behavioral Services  Medicare Certification Upon Admission    I certify that this patient's inpatient psychiatric hospital admission is medically necessary for:    [x] (1) Treatment which could reasonably be expected to improve this patient's condition,       [x] (2) Or for diagnostic study;     AND     [x](2) The inpatient psychiatric services are provided while the individual is under the care of a physician and are included in the individualized plan of care.    Estimated length of stay/service 3-5    Plan for post-hospital care Op care    Electronically signed by NAYELY FLANAGAN MD on 4/15/2024 at 9:18 AM      
Patient at the desk requested/ received PRN Tylenol for a headache.   
Patient attended and participated in [id number] [facility].        Electronically signed by Chiara Parks on 4/15/2024 at 8:12 PM   
Pt is just out from seeing the doctor and talking on the phone , gave am Macrobid, pt denied questions, denied any suicidal thoughts , reports depression and anxiety #2, expressed she is hopeful for discharge tomorrow.  
Pt is out on the unit and social with peers. Reports good appetite and sleep. Bright affect. States she does not feel suicidal and that her overdose was not in an attempt to kill herself but to ask for help. Pt is attending groups. Pt is anxious d/t being here and stressors in her personal life. Denies depression. Denies HI, AVH. Pt is attending to ADLs and appears well kempt. Appears to have good insight into her situation. Judgement is improving.  
Pt requested and given PRN excedrin for headache rated 5 out of 10 with 10 being the worst.  
103   CO2 26   BUN 6   CREATININE 0.76   GLUCOSE 86     Recent Labs     04/14/24  0045   BILITOT 0.4   ALKPHOS 74   AST 27   ALT 15     Lab Results   Component Value Date/Time    LABAMPH Neg 04/14/2024 05:30 AM    BARBSCNU Neg 04/14/2024 05:30 AM    LABBENZ Neg 04/14/2024 05:30 AM    LABMETH Neg 04/14/2024 05:30 AM    OPIATESCREENURINE Neg 04/14/2024 05:30 AM    PHENCYCLIDINESCREENURINE Neg 04/14/2024 05:30 AM    ETOH 143 04/14/2024 02:00 AM     Lab Results   Component Value Date/Time    TSH 1.250 04/14/2024 12:45 AM     No results found for: \"LITHIUM\"  No results found for: \"VALPROATE\", \"CBMZ\"    RISK ASSESSMENT: low suicide risk    Treatment Plan:  Reviewed current Medications with the patient.   Pt and mom want her not to be put on any medication until they speak with her psychiatrist  Risks, benefits, side effects, drug-to-drug interactions and alternatives to treatment were discussed.  Collateral information:   CD evaluation  Encourage patient to attend group and other milieu activities.  Discharge planning discussed with the patient and treatment team.    PSYCHOTHERAPY/COUNSELING:  [x] Therapeutic interview  [x] Supportive  [] CBT  [] Ongoing  [] Other  Patient was seen 1:1 for 20 minutes, other than E&M time spent, focusing on      - coping skills techniques     - Anxiety management techniques discussed including deep breathing exercise and PMR     - discussing patients strength and weakness      [x] Patient continues to need, on a daily basis, active treatment furnished directly by or requiring the supervision of inpatient psychiatric personnel        Electronically signed by NAYELY FLANAGAN MD on 4/16/2024 at 10:32 AM

## 2024-04-17 VITALS
RESPIRATION RATE: 20 BRPM | TEMPERATURE: 98.1 F | SYSTOLIC BLOOD PRESSURE: 116 MMHG | DIASTOLIC BLOOD PRESSURE: 76 MMHG | HEART RATE: 55 BPM | OXYGEN SATURATION: 100 %

## 2024-04-17 LAB
C TRACH DNA UR QL NAA+PROBE: NEGATIVE
N GONORRHOEA DNA UR QL NAA+PROBE: NEGATIVE

## 2024-04-17 PROCEDURE — 6370000000 HC RX 637 (ALT 250 FOR IP): Performed by: EMERGENCY MEDICINE

## 2024-04-17 RX ORDER — NITROFURANTOIN 25; 75 MG/1; MG/1
100 CAPSULE ORAL EVERY 12 HOURS SCHEDULED
Qty: 4 CAPSULE | Refills: 0 | Status: SHIPPED | OUTPATIENT
Start: 2024-04-17 | End: 2024-04-19

## 2024-04-17 RX ADMIN — NITROFURANTOIN MONOHYDRATE/MACROCRYSTALS 100 MG: 75; 25 CAPSULE ORAL at 08:54

## 2024-04-17 NOTE — CARE COORDINATION
Discharge Instructions reviewed verbally and in writing, including follow up appointments.  Patient verbalizes understanding and signed as such.  All belongings returned for discharge.  Patient provided with food and drug interaction booklet.  Patient denies SI, HI, and A/V Hallucinations.  Mood is stable.

## 2024-04-17 NOTE — GROUP NOTE
Date: 4/17/2024    Group Start Time: 0910  Group End Time: 0940  Group Topic: Music Therapy    [facility] [place] 3W Shea Burks    Music and Mindfulness  Patients will be asked to listen to selections from \"Carnival of Animals\" by Camille Saint-Saens and identify what animal they think the composer intended to represent through the music based on what they hear. Patients will be challenged to stay mindful on the music while listening, and take note of how their emotions might change. Patients will discuss their interpretation of each selection and the benefits and challenges of staying mindful.     Focus: Coping Skills and Mindfulness Techniques    Goals: Increase Attention to Task, Develop/Maintain Coping Skills, Improve Mood, Increase/Maintain Expressive Communication, Improve/Maintain Self-Expression, Improve/Maintain Self-Awareness/Insight, Decrease Impulsive Behaviors, Increase Relaxation/Decrease Feelings of Distress    Selections: I. Introduction and Royal March of the Lion; II. Hens and Cocks; VI. Kangaroos; VII. Aquarium; XII: Fossils; XIII. The Ranjeet    Patient listened to recorded classical music and contributed several relevant guesses as to what animal she thought was trying to be represented in the music. Patient stopped into group to say goodbye to peers/staff, and was discharged during group.    Attended: 1/4-1/2 attendance  Patient's Goal: \"hope to go home\"     Participation Level: Active Listener and Interactive  Participation Quality: Attentive and Sharing  Affect/Mood: Bright/Brightens  Speech: spontaneous, normal rate, and normal volume   Thought Content/Processes: Linear  Level of consciousness: Alert  Response: Able to verbalize current knowledge/experience and Able to verbalize/acknowledge new learning  Actively participated in the group experience and Anticipated discharge today  Modes of Intervention: Music and Mindfulness and Receptive Music Listening    Discipline Responsible: Music  Therapist  Signature: KRISTINA Petty, PsychEd Spec

## 2024-04-17 NOTE — DISCHARGE INSTRUCTIONS
Keep all follow up appointments, take medications as ordered, utilize positive supports, abstain from use of alcohol and drugs. If symptoms return or you feel at risk to yourself or others, please call 911, return the nearest emergency room, or call your local crisis hotline:  [facility]: [phone]  [facility]: [phone]  [facility]: [phone]    Due to the Covid-19 Pandemic, [facility]oking Cessation [facility] is not currently available. For assistance with quitting smoking please go to https://smokefree.gov. A prescription for an FDA-approved tobacco cessation medication was offered at discharge and the patient refused.

## 2024-04-17 NOTE — DISCHARGE INSTR - DIET

## 2024-04-17 NOTE — TRANSITION OF CARE
Behavioral Health Transition Record to Provider    Patient Name: Juvencio Yun  YOB: 2005   Medical Record Number: [id number]  Date of Admission: 4/14/2024  2:56 AM   Date of Discharge: 04/17/2024    Attending Provider: Shukri Alcocer MD   Discharging Provider: Dr. Alcocer  To contact this individual call [phone] and ask the  to page.  If unavailable, ask to be transferred to Behavioral Health Provider on call.  A Behavioral Health Provider will be available on call 24/7 and during holidays.    Primary Care Provider: No primary care provider on file.    Not on File    Reason for Admission: Juvencio Yun is a 19 y.o. female who presents to the emergency department as a transfer from [facility] facility for medical clearance for psychiatric evaluation.  Chart review notes that she had initially presented after taking a handful of Lexapro and Lamictal tonight after having thoughts of past trauma.  She denies SI/HI/AVH.  She was subsequently placed under involuntary psychiatric hold, poison control was contacted, patient was given activated charcoal   She currently states that she has no acute complaint and feels much better   Reportedly on Lexapro and Lamictal for depression, was also drinking alcohol tonight   HPI   Chart review notes history of asthma      In the BAC:      Patient reported she drank and took a handful of pills. 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 Patient reports she's been drinking more than usual. Has a history of high functioning depression. Started to see a psychiatrist when she was in middle school. No history of prior suicide attempts. Patient is pink slipped. 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 Patient reports she acted out as a  method to seek attention. Patient is potentially minimizing self harming incident.     Admission Diagnosis: Acute alcoholic intoxication without complication ([facility]) [F10.920]  Medication overdose, intentional self-harm, initial encounter ([facility]) [T50.362A]  Major depressive disorder with current active episode, unspecified depression episode severity, unspecified whether recurrent [F32.9]  Major depressive disorder, recurrent ([facility]) [F33.9]    * No surgery found *    Results for orders placed or performed during the hospital encounter of 04/14/24   C.trachomatis N.gonorrhoeae DNA, Urine    Specimen: Urine   Result Value Ref Range    C. trachomatis DNA ,Urine Negative Negative    N. gonorrhoeae DNA, Urine Negative Negative   Culture, Urine    Specimen: Urine, clean catch   Result Value Ref Range    Urine Culture, Routine       Cult,Urine:  NO SIGNIFICANT GROWTH  Performed at [address] [id number] ([phone]     Urinalysis with Reflex to Culture    Specimen: Urine   Result Value Ref Range    Color, UA Yellow Straw/Yellow    Clarity, UA CLOUDY (A) Clear    Glucose, Ur Negative Negative mg/dL    Bilirubin Urine Negative Negative    Ketones, Urine Negative Negative mg/dL    Specific Gravity, UA 1.012 1.005 - 1.030    Blood, Urine TRACE (A) Negative    pH, UA 6.0 5.0 - 9.0    Protein, UA Negative Negative mg/dL    Urobilinogen, Urine 0.2 <2.0 E.U./dL    Nitrite, Urine Negative Negative    Leukocyte Esterase, Urine MODERATE (A) Negative    Urine Reflex to Culture Yes    URINE DRUG SCREEN   Result Value Ref Range    Amphetamine Screen, Urine Neg Negative <1000 ng/mL    Barbiturate Screen, Ur Neg Negative < 200 ng/mL    Benzodiazepine Screen, Urine Neg Negative < 200 ng/mL    Cannabinoid Scrn, Ur Neg Negative < 50 ng/mL    Cocaine Metabolite Screen, Urine Neg Negative < 300 ng/mL    Opiate Scrn, Ur Neg Negative < 300 ng/mL    PCP Screen, Urine Neg Negative < 25 ng/mL    Methadone Screen, Urine
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
## 2024-04-17 NOTE — PLAN OF CARE
Patient denies all SI, HI and AVH. Affect is bright and reactive. Patient appropriate in conversation and interaction. Pt is discharge focused and future-oriented. Pt reports her Mother is going to stay in town for a while for support. No complaints verbalized to staff.   Problem: Risk for Elopement  Goal: Patient will not exit the unit/facility without proper excort  4/16/2024 2348 by Margarita Dave RN  Outcome: Progressing  4/16/2024 1122 by Carrie Anaya RN  Outcome: Progressing     Problem: Anxiety  Goal: Will report anxiety at manageable levels  4/16/2024 2348 by Margarita Dave RN  Outcome: Progressing  4/16/2024 1122 by Carrie Anaya RN  Outcome: Progressing     Problem: Coping  Goal: Pt/Family able to verbalize concerns and demonstrate effective coping strategies  4/16/2024 2348 by Margarita Dave RN  Outcome: Progressing  4/16/2024 1122 by Carrie Anaya RN  Outcome: Progressing     Problem: Decision Making  Goal: Pt/Family able to effectively weigh alternatives and participate in decision making related to treatment and care  4/16/2024 2348 by Margarita Dave RN  Outcome: Progressing  4/16/2024 1122 by Carrie Anaya RN  Outcome: Progressing     Problem: Behavior  Goal: Pt/Family maintain appropriate behavior and adhere to behavioral management agreement, if implemented  4/16/2024 2348 by Margarita Dave RN  Outcome: Progressing  4/16/2024 1122 by Carrie Anaya RN  Outcome: Progressing     Problem: Depression/Self Harm  Goal: Effect of psychiatric condition will be minimized and patient will be protected from self harm  4/16/2024 2348 by Margarita Dave RN  Outcome: Progressing  4/16/2024 1122 by Carrie Anaya RN  Outcome: Progressing     Problem: Involuntary Admit  Goal: Will cooperate with staff recommendations and doctor's orders and will demonstrate appropriate behavior  4/16/2024 2348 by Margarita Dave RN  Outcome: Progressing  4/16/2024 1122 by Carrie Anaya RN  Outcome: Progressing     Problem: Discharge  Planning  Goal: Discharge to home or other facility with appropriate resources  4/16/2024 2348 by Margarita Dave, RN  Outcome: Progressing  4/16/2024 1122 by Carrie Anaya RN  Outcome: Progressing

## 2024-04-17 NOTE — PLAN OF CARE
poses a threat to safety call refer to organization policy.  7. Initiate consult with , Psychosocial CNS, Spiritual Care as appropriate  4/17/2024 0721 by Katie Harris RN  Outcome: Completed  4/16/2024 2348 by Margarita Dave RN  Outcome: Progressing  Flowsheets (Taken 4/16/2024 1124 by Carrie fuentes RN)  Patient/family maintains appropriate behavior and adheres to behavioral management agreement, if implemented:   Utilize positive, consistent limit setting strategies supporting safety of patient, staff and others   Encourage verbalization of thoughts and concerns in a socially appropriate manner     Problem: Depression/Self Harm  Goal: Effect of psychiatric condition will be minimized and patient will be protected from self harm  Description: INTERVENTIONS:  1. Assess impact of patient's symptoms on level of functioning, self care needs and offer support as indicated  2. Assess patient/family knowledge of depression, impact on illness and need for teaching  3. Provide emotional support, presence and reassurance  4. Assess for possible suicidal thoughts or ideation. If patient expresses suicidal thoughts or statements do not leave alone, initiate Suicide Precautions, move to a room close to the nursing station and obtain sitter  5. Initiate consults as appropriate with Mental Health Professional, Spiritual Care, Psychosocial CNS, and consider a recommendation to the LIP for a Psychiatric Consultation  4/17/2024 0721 by Katie Harris RN  Outcome: Completed  4/16/2024 2348 by Margarita Dave RN  Outcome: Progressing  Flowsheets  Taken 4/16/2024 2347 by Margarita Dave RN  Effect of psychiatric condition will be minimized and patient will be protected from self harm: Provide emotional support, presence and reassurance  Taken 4/16/2024 1124 by Carrie fuentes RN  Effect of psychiatric condition will be minimized and patient will be protected from self harm:   Provide emotional support, presence  and reassurance   Assess for suicidal thoughts or ideation. If patient expresses suicidal thoughts or statements do not leave alone, initiate Suicide Precautions, move near nurse station, obtain sitter     Problem: Involuntary Admit  Goal: Will cooperate with staff recommendations and doctor's orders and will demonstrate appropriate behavior  Description: INTERVENTIONS:  1. Treat underlying conditions and offer medication as ordered  2. Educate regarding involuntary admission procedures and rules  3. Contain excessive/inappropriate behavior per unit and hospital policies  4/17/2024 0721 by Katie Harris RN  Outcome: Completed  4/16/2024 2348 by Margarita Dave RN  Outcome: Progressing  Flowsheets (Taken 4/16/2024 1124 by Carrie, RN)  Will cooperate with staff recommendations and doctor's orders and will demonstrate appropriate behavior: Contain excessive/inappropriate behavior per unit and hospital policies     Problem: Discharge Planning  Goal: Discharge to home or other facility with appropriate resources  4/17/2024 0721 by Katie Harris, RN  Outcome: Completed  4/16/2024 2348 by Margarita Dave, RN  Outcome: Progressing  Flowsheets (Taken 4/16/2024 1124 by Carrie, RN)  Discharge to home or other facility with appropriate resources: Identify discharge learning needs (meds, wound care, etc)